# Patient Record
Sex: FEMALE | Race: WHITE
[De-identification: names, ages, dates, MRNs, and addresses within clinical notes are randomized per-mention and may not be internally consistent; named-entity substitution may affect disease eponyms.]

---

## 2019-06-13 ENCOUNTER — HOSPITAL ENCOUNTER (EMERGENCY)
Dept: HOSPITAL 91 - FTE | Age: 49
Discharge: HOME | End: 2019-06-13
Payer: COMMERCIAL

## 2019-06-13 ENCOUNTER — HOSPITAL ENCOUNTER (EMERGENCY)
Dept: HOSPITAL 10 - FTE | Age: 49
Discharge: HOME | End: 2019-06-13
Payer: COMMERCIAL

## 2019-06-13 VITALS
BODY MASS INDEX: 31.95 KG/M2 | WEIGHT: 187.17 LBS | BODY MASS INDEX: 31.95 KG/M2 | WEIGHT: 187.17 LBS | HEIGHT: 64 IN | HEIGHT: 64 IN

## 2019-06-13 VITALS — DIASTOLIC BLOOD PRESSURE: 67 MMHG | SYSTOLIC BLOOD PRESSURE: 113 MMHG | RESPIRATION RATE: 18 BRPM | HEART RATE: 69 BPM

## 2019-06-13 DIAGNOSIS — R10.2: ICD-10-CM

## 2019-06-13 DIAGNOSIS — I10: ICD-10-CM

## 2019-06-13 DIAGNOSIS — N93.9: Primary | ICD-10-CM

## 2019-06-13 LAB
ADD MAN DIFF?: NO
ADD UMIC: YES
ALANINE AMINOTRANSFERASE: 62 IU/L (ref 13–69)
ALBUMIN/GLOBULIN RATIO: 1.25
ALBUMIN: 3.9 G/DL (ref 3.3–4.9)
ALKALINE PHOSPHATASE: 75 IU/L (ref 42–121)
ANION GAP: 9 (ref 5–13)
ASPARTATE AMINO TRANSFERASE: 34 IU/L (ref 15–46)
BASOPHIL #: 0 10^3/UL (ref 0–0.1)
BASOPHILS %: 0.8 % (ref 0–2)
BILIRUBIN,DIRECT: 0 MG/DL (ref 0–0.2)
BILIRUBIN,TOTAL: 0.5 MG/DL (ref 0.2–1.3)
BLOOD UREA NITROGEN: 13 MG/DL (ref 7–20)
CALCIUM: 9.4 MG/DL (ref 8.4–10.2)
CARBON DIOXIDE: 25 MMOL/L (ref 21–31)
CHLORIDE: 108 MMOL/L (ref 97–110)
CREATININE: 0.73 MG/DL (ref 0.44–1)
EOSINOPHILS #: 0.2 10^3/UL (ref 0–0.5)
EOSINOPHILS %: 2.9 % (ref 0–7)
GLOBULIN: 3.1 G/DL (ref 1.3–3.2)
GLUCOSE: 133 MG/DL (ref 70–220)
HEMATOCRIT: 30.5 % (ref 37–47)
HEMOGLOBIN: 9.9 G/DL (ref 12–16)
IMMATURE GRANS #M: 0.01 10^3/UL (ref 0–0.03)
IMMATURE GRANS % (M): 0.2 % (ref 0–0.43)
LIPASE: 235 U/L (ref 23–300)
LYMPHOCYTES #: 1.5 10^3/UL (ref 0.8–2.9)
LYMPHOCYTES %: 29.1 % (ref 15–51)
MEAN CORPUSCULAR HEMOGLOBIN: 28.8 PG (ref 29–33)
MEAN CORPUSCULAR HGB CONC: 32.5 G/DL (ref 32–37)
MEAN CORPUSCULAR VOLUME: 88.7 FL (ref 82–101)
MEAN PLATELET VOLUME: 10.5 FL (ref 7.4–10.4)
MONOCYTE #: 0.4 10^3/UL (ref 0.3–0.9)
MONOCYTES %: 7.9 % (ref 0–11)
NEUTROPHIL #: 3 10^3/UL (ref 1.6–7.5)
NEUTROPHILS %: 59.1 % (ref 39–77)
NUCLEATED RED BLOOD CELLS #: 0 10^3/UL (ref 0–0)
NUCLEATED RED BLOOD CELLS%: 0 /100WBC (ref 0–0)
PLATELET COUNT: 262 10^3/UL (ref 140–415)
POTASSIUM: 4.5 MMOL/L (ref 3.5–5.1)
RED BLOOD COUNT: 3.44 10^6/UL (ref 4.2–5.4)
RED CELL DISTRIBUTION WIDTH: 12.9 % (ref 11.5–14.5)
SODIUM: 142 MMOL/L (ref 135–144)
TOTAL PROTEIN: 7 G/DL (ref 6.1–8.1)
UR ASCORBIC ACID: NEGATIVE MG/DL
UR BILIRUBIN (DIP): NEGATIVE MG/DL
UR BLOOD (DIP): (no result) MG/DL
UR CLARITY: (no result)
UR COLOR: (no result)
UR GLUCOSE (DIP): NEGATIVE MG/DL
UR KETONES (DIP): NEGATIVE MG/DL
UR LEUKOCYTE ESTERASE (DIP): NEGATIVE LEU/UL
UR NITRITE (DIP): NEGATIVE MG/DL
UR PH (DIP): 6 (ref 5–9)
UR RBC: > 182 /HPF (ref 0–5)
UR SPECIFIC GRAVITY (DIP): 1.01 (ref 1–1.03)
UR TOTAL PROTEIN (DIP): (no result) MG/DL
UR UROBILINOGEN (DIP): NEGATIVE MG/DL
UR WBC: 17 /HPF (ref 0–5)
WHITE BLOOD COUNT: 5.1 10^3/UL (ref 4.8–10.8)

## 2019-06-13 PROCEDURE — 76830 TRANSVAGINAL US NON-OB: CPT

## 2019-06-13 PROCEDURE — 85025 COMPLETE CBC W/AUTO DIFF WBC: CPT

## 2019-06-13 PROCEDURE — 80053 COMPREHEN METABOLIC PANEL: CPT

## 2019-06-13 PROCEDURE — 96374 THER/PROPH/DIAG INJ IV PUSH: CPT

## 2019-06-13 PROCEDURE — 81001 URINALYSIS AUTO W/SCOPE: CPT

## 2019-06-13 PROCEDURE — 36415 COLL VENOUS BLD VENIPUNCTURE: CPT

## 2019-06-13 PROCEDURE — 83690 ASSAY OF LIPASE: CPT

## 2019-06-13 PROCEDURE — 99285 EMERGENCY DEPT VISIT HI MDM: CPT

## 2019-06-13 PROCEDURE — 76856 US EXAM PELVIC COMPLETE: CPT

## 2019-06-13 PROCEDURE — 96375 TX/PRO/DX INJ NEW DRUG ADDON: CPT

## 2019-06-13 PROCEDURE — 81025 URINE PREGNANCY TEST: CPT

## 2019-06-13 RX ADMIN — KETOROLAC TROMETHAMINE 1 MG: 30 INJECTION, SOLUTION INTRAMUSCULAR at 07:11

## 2019-06-13 RX ADMIN — THIAMINE HYDROCHLORIDE 1 MLS/HR: 100 INJECTION, SOLUTION INTRAMUSCULAR; INTRAVENOUS at 07:11

## 2019-06-13 RX ADMIN — ONDANSETRON HYDROCHLORIDE 1 MG: 2 INJECTION, SOLUTION INTRAMUSCULAR; INTRAVENOUS at 07:11

## 2019-06-13 NOTE — ERD
ER Documentation


Chief Complaint


Chief Complaint





VAG BLEED X'S 28 DAYS





HPI


49-year-old female presenting with vaginal bleeding for the last month.  Patient


states that she has an upcoming appointment with her primary but was concerned 


because she is having excessive bleeding.  Patient has some generalized lower 


pelvic pain.  She is taking iron currently.  Patient states this is been 


consistent over the last month but has been using about 6-8 pads for the last 


few days.  She does describe some clots.  Medical history is hypertension and 


anemia.  Social history denies.  Surgical history denies.





ROS


All systems reviewed and are negative except as per history of present illness.





Medications


Home Meds


Active Scripts


Ferrous Sulfate* (Ferrous Sulfate*) 325 Mg Tabec, 325 MG PO DAILY, #30 TAB


   Prov:JAVAN MERCEDES PA-C         19


Docusate Sodium* (Colace*) 100 Mg Capsule, 100 MG PO TID, #30 CAP


   Prov:JAVAN MERCEDES PA-C         19


Norethindrone-E.estradiol-Iron (Loestrin Fe 1.5-30 Tablet) 1 Each Tablet, 1 EACH


PO DAILY, #30 TAB


   Prov:JAVAN MERCEDES PA-C         19


Ciprofloxacin Hcl* (Ciprofloxacin Hcl*) 500 Mg Tablet, 500 MG PO BID for 3 Days,


TAB


   Prov:STARLA AMAYA PA-C         16


Ondansetron Hcl* (Zofran*) 4 Mg Tab, 4 MG PO Q4H PRN for NAUSEA AND OR VOMITING,


#15 TAB


   Prov:STARLA AMAYA PA-C         16


Ferrous Sulfate* (Ferrous Sulfate*) 325 Mg Tabec, 325 MG PO DAILY, #60 TAB


   Prov:STARLA AMAYA PA-C         16





Allergies


Allergies:  


Coded Allergies:  


     No Known Allergy (Unverified , 19)





PMhx/Soc


History of Surgery:  Yes (GB)


Hx Cardiac Disorders:  Yes (HTN)


Hx Miscellaneous Medical Probl:  Yes (Anemia)


Hx Alcohol Use:  No


Hx Substance Use:  No


Hx Tobacco Use:  No


Smoking Status:  Never smoker





FmHx


Family History:  No diabetes, No coronary disease, No other





Physical Exam


Vitals





Vital Signs


  Date      Temp  Pulse  Resp  B/P (MAP)   Pulse Ox  O2          O2 Flow    FiO2


Time                                                 Delivery    Rate


   19  98.1     69    18      113/67        98  Room Air


     07:39                           (82)


   19  96.9     76    18      129/72       100


     06:00                           (91)





Physical Exam


GENERAL: The patient is well-appearing, well-nourished, in no acute distress


CHEST: Clear to auscultation bilaterally.  There are no rales, wheezes or 


rhonchi.


HEART: Regular rate and rhythm.  No murmurs, clicks, rubs or gallops.  


ABDOMEN: Normal active bowel sounds.  No distention.  No organomegaly.  Tender 


to palpation of the suprapubic region with no lateralized tenderness.


Result Diagram:  


19 0640                                                                    


           19 0640





Results 24 hrs





Laboratory Tests


      Test
                                  19
06:40  19
06:44


      White Blood Count                       5.1 10^3/ul


      Red Blood Count                        3.44 10^6/ul


      Hemoglobin                                 9.9 g/dl


      Hematocrit                                   30.5 %


      Mean Corpuscular Volume                     88.7 fl


      Mean Corpuscular Hemoglobin                 28.8 pg


      Mean Corpuscular Hemoglobin
Concent      32.5 g/dl 
  



      Red Cell Distribution Width                  12.9 %


      Platelet Count                          262 10^3/UL


      Mean Platelet Volume                        10.5 fl


      Immature Granulocytes %                     0.200 %


      Neutrophils %                                59.1 %


      Lymphocytes %                                29.1 %


      Monocytes %                                   7.9 %


      Eosinophils %                                 2.9 %


      Basophils %                                   0.8 %


      Nucleated Red Blood Cells %             0.0 /100WBC


      Immature Granulocytes #               0.010 10^3/ul


      Neutrophils #                           3.0 10^3/ul


      Lymphocytes #                           1.5 10^3/ul


      Monocytes #                             0.4 10^3/ul


      Eosinophils #                           0.2 10^3/ul


      Basophils #                             0.0 10^3/ul


      Nucleated Red Blood Cells #             0.0 10^3/ul


      Urine Color                          RED


      Urine Clarity
                       SLIGHTLY
CLOUDY  



      Urine pH                                        6.0


      Urine Specific Gravity                        1.009


      Urine Ketones                        NEGATIVE mg/dL


      Urine Nitrite                        NEGATIVE mg/dL


      Urine Bilirubin                      NEGATIVE mg/dL


      Urine Urobilinogen                   NEGATIVE mg/dL


      Urine Leukocyte Esterase
            NEGATIVE
Dwayne/ul  



      Urine Microscopic RBC                > 182 /HPF


      Urine Microscopic WBC                       17 /HPF


      Urine Hemoglobin                           3+ mg/dL


      Urine Glucose                        NEGATIVE mg/dL


      Urine Total Protein                        1+ mg/dl


      Sodium Level                             142 mmol/L


      Potassium Level                          4.5 mmol/L


      Chloride Level                           108 mmol/L


      Carbon Dioxide Level                      25 mmol/L


      Anion Gap                                         9


      Blood Urea Nitrogen                        13 mg/dl


      Creatinine                               0.73 mg/dl


      Est Glomerular Filtrat Rate
mL/min   > 60 mL/min 
    



      Glucose Level                             133 mg/dl


      Calcium Level                             9.4 mg/dl


      Total Bilirubin                           0.5 mg/dl


      Direct Bilirubin                         0.00 mg/dl


      Indirect Bilirubin                        0.5 mg/dl


      Aspartate Amino Transf
(AST/SGOT)          34 IU/L 
  



      Alanine Aminotransferase
(ALT/SGPT)        62 IU/L 
  



      Alkaline Phosphatase                        75 IU/L


      Total Protein                              7.0 g/dl


      Albumin                                    3.9 g/dl


      Globulin                                  3.10 g/dl


      Albumin/Globulin Ratio                         1.25


      Lipase                                      235 U/L


      POC Beta HCG, Qualitative                             NEGATIVE





Current Medications


 Medications
   Dose
          Sig/Chelle
       Start Time
   Status  Last


 (Trade)       Ordered        Route
 PRN     Stop Time              Admin
Dose


                              Reason                                Admin


 Sodium         1,000 ml @ 
   Q1H STAT
      19       DC           19


Chloride       1,000 mls/hr   IV
            06:24
                       07:11



                                             19 07:23


 Ondansetron    4 mg           ONCE  STAT
    19       DC           19


HCl
  (Zofran                 IV
            06:24
                       07:11



Inj)                                         19 06:26


 Ketorolac
     30 mg          ONCE  STAT
    19       DC           19


Tromethamine
                 IV
            06:24
                       07:11



 (Toradol)                                   19 06:26








Procedures/MDM





                            DIAGNOSTIC IMAGING REPORT





Patient: RAYNA FELICIANO   : 1970   Age: 49  Sex: F                      


 


       MR #:    [id number]   Acct #:   [id number]    DOS: 1924


Ordering MD: MEREDITH MERCEDES PA-C   Location:  [facility]   Room/Bed:            


                               


                                        


PROCEDURE:   Pelvic ultrasound color-flow Doppler of the adnexa.


 


CLINICAL INDICATION:   Pain. Heavy vaginal bleeding. 


 


TECHNIQUE: Multiple sagittal, oblique and transverse real time images were 


obtained of the lower abdomen and pelvis using a transabdominal as well a 


transvaginal approach. Color-flow Doppler of the adnexa was performed.


 


COMPARISON:   None. 


 


FINDINGS:


The uterus is normal limits in size measuring 9.94 x 5.01 x 6.52 cm. Within the 


cervix is an intramural leiomyoma measuring 2.1 x 2.6 x 1.5 cm. Additional 


Nabothian cysts. Remainder the myometrial echoes are homogeneous. Endometrium 


homogeneous without focal lesion slightly thickened maximal AP diameter 1.09 cm.


Right ovary normal in size measuring 3.11 x 1.5 x 2.36 cm without mass. Left 


ovary enlarged measuring 5.65 x 2.87 x 3.70 cm and contains a 2.4 x 2.9 x 2.3 cm


cyst. Flow to both ovaries without ultrasonic evidence of ovarian torsion. No 


adnexal masses or free fluid.


 


IMPRESSION:


 


1.  Nabothian cysts and intramural 2.1 x 2.6 x 1.5 cm submucosal cervical 


leiomyoma.


2.  Slightly thickened homogeneous endometrium without focal lesion.


3.  Enlarged left ovary containing a 2.9 cm cyst.


4.  No ultrasonic evidence of ovarian torsion.


5.  No adnexal masses or free fluid.


 





MDM: 49-year-old female presenting with vaginal bleeding.  Patient does have 


findings consistent with fibroids.  Patient is anemic and is recommended to take


birth control pills to manage hormonal improvements of the fibroids.  Patient 


likely needs uterine biopsy and is told to follow-up with OB/GYN within the next


1 to 2 days.  Patient does not require transfusion at this time.  Patient is 


told symptoms change or worsen to return immediately to the ER.  All questions 


answered at discharge





Departure


Diagnosis:  


   Primary Impression:  


   Vaginal bleeding


Condition:  Stable


Patient Instructions:  Uterine Fibroids


Referrals:  


[facility] (PCP)








OB/GYN REFERRAL LIST


ALEKSANDAR ADAMS MD


[address] [address] [id number] [phone] OFFICE FAX [phone]





DR.ABUSLEME ARTIE


[address] [place], [place] [id number] [phone]





DR. WELLS [facility]


[address] [place], [place] [id number] [phone]





[facility]


[address] [facility], [facility] [address] [place] [address]


[phone]





VICKI SCOTT


[address] [place], [place] [id number] [phone]





[facility]


[address] [place], [place] [address] [phone] [address] [facility] [address] [phone]  -  [phone]





LINDSAY DIAZ


[address], [facility] [id number] [phone]





DR CODY, SONNY


[address] [facility]. [address], [facility] [address]


[phone]





DR PARR, [facility]


[address] [place] [address] [phone]





Additional Instructions:  


FOLLOW UP WITH YOUR PRIMARY CARE PHYSICIAN TOMORROW.Return to this facility if 


you are not improving as expected.











JAVAN MERCEDES PA-C       2019 08:14